# Patient Record
Sex: FEMALE | Race: OTHER | HISPANIC OR LATINO | URBAN - METROPOLITAN AREA
[De-identification: names, ages, dates, MRNs, and addresses within clinical notes are randomized per-mention and may not be internally consistent; named-entity substitution may affect disease eponyms.]

---

## 2020-01-03 ENCOUNTER — EMERGENCY (EMERGENCY)
Facility: HOSPITAL | Age: 55
LOS: 1 days | End: 2020-01-03
Admitting: EMERGENCY MEDICINE
Payer: COMMERCIAL

## 2020-01-03 PROCEDURE — 99284 EMERGENCY DEPT VISIT MOD MDM: CPT

## 2020-01-03 PROCEDURE — 74175 CTA ABDOMEN W/CONTRAST: CPT | Mod: 26

## 2021-05-11 ENCOUNTER — APPOINTMENT (OUTPATIENT)
Age: 56
End: 2021-05-11

## 2021-06-08 ENCOUNTER — APPOINTMENT (OUTPATIENT)
Age: 56
End: 2021-06-08

## 2024-05-21 DIAGNOSIS — Z87.19 PERSONAL HISTORY OF OTHER DISEASES OF THE DIGESTIVE SYSTEM: ICD-10-CM

## 2024-05-21 DIAGNOSIS — Z78.9 OTHER SPECIFIED HEALTH STATUS: ICD-10-CM

## 2024-05-21 DIAGNOSIS — Z91.040 LATEX ALLERGY STATUS: ICD-10-CM

## 2024-05-21 DIAGNOSIS — J45.20 MILD INTERMITTENT ASTHMA, UNCOMPLICATED: ICD-10-CM

## 2024-05-21 DIAGNOSIS — Z86.79 PERSONAL HISTORY OF OTHER DISEASES OF THE CIRCULATORY SYSTEM: ICD-10-CM

## 2024-05-21 DIAGNOSIS — Z83.6 FAMILY HISTORY OF OTHER DISEASES OF THE RESPIRATORY SYSTEM: ICD-10-CM

## 2024-05-21 PROBLEM — Z00.00 ENCOUNTER FOR PREVENTIVE HEALTH EXAMINATION: Status: ACTIVE | Noted: 2024-05-21

## 2024-05-22 ENCOUNTER — APPOINTMENT (OUTPATIENT)
Dept: PEDIATRIC ALLERGY IMMUNOLOGY | Facility: CLINIC | Age: 59
End: 2024-05-22
Payer: COMMERCIAL

## 2024-05-22 ENCOUNTER — NON-APPOINTMENT (OUTPATIENT)
Age: 59
End: 2024-05-22

## 2024-05-22 VITALS
OXYGEN SATURATION: 97 % | BODY MASS INDEX: 31.56 KG/M2 | DIASTOLIC BLOOD PRESSURE: 84 MMHG | SYSTOLIC BLOOD PRESSURE: 124 MMHG | HEIGHT: 63 IN | HEART RATE: 60 BPM | WEIGHT: 178.13 LBS | TEMPERATURE: 97.8 F

## 2024-05-22 DIAGNOSIS — T78.04XD ANAPHYLACTIC REACTION DUE TO FRUITS AND VEGETABLES, SUBSEQUENT ENCOUNTER: ICD-10-CM

## 2024-05-22 DIAGNOSIS — J30.1 ALLERGIC RHINITIS DUE TO POLLEN: ICD-10-CM

## 2024-05-22 DIAGNOSIS — J45.31 MILD PERSISTENT ASTHMA WITH (ACUTE) EXACERBATION: ICD-10-CM

## 2024-05-22 DIAGNOSIS — T78.05XD ANAPHYLACTIC REACTION DUE TO TREE NUTS AND SEEDS, SUBSEQUENT ENCOUNTER: ICD-10-CM

## 2024-05-22 PROCEDURE — 94010 BREATHING CAPACITY TEST: CPT

## 2024-05-22 PROCEDURE — 99214 OFFICE O/P EST MOD 30 MIN: CPT | Mod: 25

## 2024-05-22 RX ORDER — LEVALBUTEROL TARTRATE 45 UG/1
45 AEROSOL, METERED ORAL
Qty: 1 | Refills: 5 | Status: ACTIVE | COMMUNITY
Start: 2024-05-22 | End: 1900-01-01

## 2024-05-22 RX ORDER — EPINEPHRINE 0.3 MG/.3ML
0.3 INJECTION INTRAMUSCULAR
Qty: 4 | Refills: 2 | Status: ACTIVE | COMMUNITY
Start: 2024-05-22 | End: 1900-01-01

## 2024-05-22 RX ORDER — OLOPATADINE HYDROCHLORIDE 665 UG/1
0.6 SPRAY, METERED NASAL TWICE DAILY
Qty: 1 | Refills: 11 | Status: ACTIVE | COMMUNITY
Start: 2024-05-22 | End: 1900-01-01

## 2024-05-22 NOTE — HISTORY OF PRESENT ILLNESS
[de-identified] : In office for follow-up for asthma, allergic rhinitis and food allergies.  Followed in the office since 9/21/2016 and previously from 6/28/2006.  Initially she gave a history of allergic reactions to bananas, apple and watermelon, consisting of swollen eyes, runny nose, itchy mouth lips and ears, throat discomfort and chest tightness.  Subsequently she developed lip and tongue tingling and throat discomfort from nuts.  She also has a history of significant allergies in the spring and in the fall.  She had chest tightness with exposure to mold.  Blood work in 2019 showed a borderline reaction to banana and was negative for latex.  Blood work in 2016 showed IgE to apple, banana, cherry, peach, pear, plum, peanuts, almond, hazelnut, pistachio, chestnut.  Skin testing in 2006 was strongly positive to tree pollen, grass, ragweed, Alternaria mold, also positive to Cladosporium.  She had systemic reaction to skin testing, consisting of throat tightness and cough.  She was treated with epinephrine, Solu-Cortef, Benadryl and cimetidine.  Spirometry's were excellent including in 2019. Today she reports having increased allergy symptoms in the last few weeks, consisting of sore throat, runny nose and cough at night.  She gets wheezing at times especially with laughing.  She used to have levalbuterol at hand, but not currently.  Albuterol made her very shaky and jittery.  She is not taking any oral antihistamines because she gets tired from them.  She used Flonase in the past but not recently.  She does not feel stuffy.  She gets tired easily and gets shortness of breath with exertion.  She does not get sick often and does not require frequent antibiotics. Foods: She avoids peach, nectarine, banana, apples, pear, points, cherry, plant, prune, apricots, and exposure to raw potatoes.  She may eat in small amounts: Citrus, berries, grapes and watermelon.  She gets gagging if she eats more.  She also avoids all tree nuts.  Carries EpiPen, needs refill. Medications reactions: Escitalopram caused irritable bowel syndrome and increased sleepiness.  Sertraline causes vision changes, headaches, drowsiness and dry mouth.  Prednisone causes nausea.  Cephalexin caused anaphylaxis (widespread urticaria, swollen lips and tongue, difficulty breathing).  Ciprofloxacin and oxycodone caused increased heart rate, increased body temperature, nausea, vomiting, dizziness, fatigue. Also has a history of latex allergy.

## 2024-05-22 NOTE — REASON FOR VISIT
[Routine Follow-Up] : a routine follow-up visit for [Allergic Rhinitis] : allergic rhinitis [To Food] : allergy to food [Asthma] : asthma

## 2024-05-22 NOTE — SOCIAL HISTORY
[de-identified] : House with no carpet in the bedroom, 3 cats, no cigarette smoke exposure, room air conditioning, baseboard heating.

## 2024-05-22 NOTE — ASSESSMENT
[FreeTextEntry1] : Asthma mild persistent with increased symptoms: Has increased cough at night and shortness of breath, occasional wheezing.  Spirometry is normal but significantly decreased from previous. Levalbuterol HFA as needed.  Also use levalbuterol HFA, 2 puffs in the morning prior to Arnuity (15 minutes before) for the next 2 weeks.  Arnuity 200, 1 puff in the morning.  Return in 3 months for repeat spirometry. Allergic rhinitis (pollen): Olopatadine 0.6%, nasal spray, 1 spray to each nostril twice daily.  If stuffy, add Flonase Sensimist, 1 spray twice daily to each nostril. 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  Carry EpiPen. Follow up in 3 months.

## 2024-05-22 NOTE — PHYSICAL EXAM
[Alert] : alert [No Acute Distress] : no acute distress [Normal Voice/Communication] : normal voice communication [Supple] : the neck was supple [Normal S1, S2] : normal S1 and S2 [Regular Rhythm] : with a regular rhythm [Soft] : abdomen soft [Normal Cervical Lymph Nodes] : cervical [Skin Intact] : skin intact  [No Rash] : no rash [No clubbing] : no clubbing [No Cyanosis] : no cyanosis [Alert, Awake, Oriented as Age-Appropriate] : alert, awake, oriented as age appropriate [de-identified] : Eyes clear. [de-identified] : Throat mild patchy erythema.  This mucosa pink, no stuffiness, scant clear discharge.  Tympanic membranes normal.  No sinus tenderness. [de-identified] : Chest clear, good air entry, no wheezing or crackles.

## 2024-05-22 NOTE — IMPRESSION
[Spirometry] : Spirometry [Normal Spirometry] : spirometry normal [FreeTextEntry1] : Spirometry normal, but significant decrease from 2019.

## 2024-05-23 ENCOUNTER — RX CHANGE (OUTPATIENT)
Age: 59
End: 2024-05-23

## 2024-05-23 ENCOUNTER — NON-APPOINTMENT (OUTPATIENT)
Age: 59
End: 2024-05-23

## 2024-05-23 RX ORDER — BUDESONIDE 180 UG/1
180 AEROSOL, POWDER RESPIRATORY (INHALATION)
Qty: 1 | Refills: 5 | Status: ACTIVE | COMMUNITY
Start: 1900-01-01 | End: 1900-01-01

## 2024-05-23 RX ORDER — FLUTICASONE FUROATE 200 UG/1
200 POWDER RESPIRATORY (INHALATION) DAILY
Qty: 1 | Refills: 5 | Status: DISCONTINUED | COMMUNITY
Start: 2024-05-22 | End: 2024-05-23

## 2024-08-21 ENCOUNTER — NON-APPOINTMENT (OUTPATIENT)
Age: 59
End: 2024-08-21

## 2024-08-21 ENCOUNTER — APPOINTMENT (OUTPATIENT)
Dept: PEDIATRIC ALLERGY IMMUNOLOGY | Facility: CLINIC | Age: 59
End: 2024-08-21
Payer: COMMERCIAL

## 2024-08-21 VITALS
DIASTOLIC BLOOD PRESSURE: 79 MMHG | OXYGEN SATURATION: 99 % | TEMPERATURE: 98 F | HEART RATE: 55 BPM | SYSTOLIC BLOOD PRESSURE: 115 MMHG

## 2024-08-21 DIAGNOSIS — J30.1 ALLERGIC RHINITIS DUE TO POLLEN: ICD-10-CM

## 2024-08-21 DIAGNOSIS — T78.04XD ANAPHYLACTIC REACTION DUE TO FRUITS AND VEGETABLES, SUBSEQUENT ENCOUNTER: ICD-10-CM

## 2024-08-21 DIAGNOSIS — J45.20 MILD INTERMITTENT ASTHMA, UNCOMPLICATED: ICD-10-CM

## 2024-08-21 PROCEDURE — 99214 OFFICE O/P EST MOD 30 MIN: CPT | Mod: 25

## 2024-08-21 PROCEDURE — 94010 BREATHING CAPACITY TEST: CPT

## 2024-08-21 NOTE — IMPRESSION
[Spirometry] : Spirometry [Normal Spirometry] : spirometry normal [FreeTextEntry1] : Spirometry normal, increase in small airways flow from 5/22/2024.

## 2024-08-21 NOTE — PHYSICAL EXAM
[Alert] : alert [No Acute Distress] : no acute distress [Normal Voice/Communication] : normal voice communication [Supple] : the neck was supple [Normal S1, S2] : normal S1 and S2 [No murmur] : no murmur [Regular Rhythm] : with a regular rhythm [Normal Cervical Lymph Nodes] : cervical [Skin Intact] : skin intact  [No Rash] : no rash [No clubbing] : no clubbing [No Cyanosis] : no cyanosis [Alert, Awake, Oriented as Age-Appropriate] : alert, awake, oriented as age appropriate [de-identified] : Eyes clear. [de-identified] : Throat clear.  Nasal mucosa pink, no stuffiness or discharge.  Tympanic membranes normal.  No sinus tenderness. [de-identified] : Chest clear, good air entry, no wheezing or crackles.

## 2024-08-21 NOTE — HISTORY OF PRESENT ILLNESS
[de-identified] : In office for follow-up.  Seen on May 22, 2024 for significant allergies and cough at night.  She also had wheezing at times especially with laughing.  She has a history of being jittery with albuterol.  She also gets tired from oral antihistamines.  At the time she was using Flonase.  She was advised to start Arnuity 200, 1 puff daily, and levalbuterol HFA as needed.  Arnuity was not covered and it was switched to Pulmicort 180.  It turned out that the Pulmicort was not covered either.  Her symptoms slowly improved if the pollen season was over.  Now she uses levalbuterol less than once a week.  She has no cough or shortness of breath.  She feels well.  Usually gets increased symptoms in spring and fall.  She was also prescribed olopatadine nasal spray, which helped. Skin testing in 2006 was strongly positive to tree pollen, grass, ragweed, Alternaria and Cladosporium.  She had systemic reaction to skin testing that required epinephrine. She avoids tree nuts and several foods (peach, nectarine, banana, apple, pear, cherry, plum, prune, apricot, raw potatoes).  She carries EpiPen. She had multiple reaction to medications including anaphylaxis with cephalexin and history of latex allergy. Current medications: Famotidine, Qulipta daily for migraines, Nurtec and Ubrelvy as needed for migraines, propranolol 60 mg for migraines, flaxseed oil for dry eyes, probiotic, calcium and vitamin D, venlafaxine, lactase.

## 2024-08-21 NOTE — ASSESSMENT
[FreeTextEntry1] : Asthma mild intermittent now, mild persistent in spring and fall.  Spirometry improved from 5/22/2024 (increased small airway flow).  Currently needs levalbuterol less than once a week but gets increased symptoms in spring and fall.  Patient to call insurance company to find out which maintenance inhaler is covered by plan, and notify our office.  Will send prescription.  Needs to start maintenance for asthma soon in preparation for fall. Allergic rhinitis (pollen, mold): Use olopatadine 0.6% and Flonase Sensimist 1 spray per nostril twice daily from both daily in spring and fall. Food allergies (fruits, nuts): Continue avoidance, carry EpiPen. Follow-up in March.

## 2025-03-11 ENCOUNTER — APPOINTMENT (OUTPATIENT)
Dept: PEDIATRIC ALLERGY IMMUNOLOGY | Facility: CLINIC | Age: 60
End: 2025-03-11
Payer: COMMERCIAL

## 2025-03-11 ENCOUNTER — NON-APPOINTMENT (OUTPATIENT)
Age: 60
End: 2025-03-11

## 2025-03-11 ENCOUNTER — RX CHANGE (OUTPATIENT)
Age: 60
End: 2025-03-11

## 2025-03-11 VITALS
TEMPERATURE: 98.5 F | OXYGEN SATURATION: 98 % | SYSTOLIC BLOOD PRESSURE: 105 MMHG | HEART RATE: 64 BPM | DIASTOLIC BLOOD PRESSURE: 70 MMHG

## 2025-03-11 DIAGNOSIS — J45.31 MILD PERSISTENT ASTHMA WITH (ACUTE) EXACERBATION: ICD-10-CM

## 2025-03-11 DIAGNOSIS — T78.04XD ANAPHYLACTIC REACTION DUE TO FRUITS AND VEGETABLES, SUBSEQUENT ENCOUNTER: ICD-10-CM

## 2025-03-11 DIAGNOSIS — J30.1 ALLERGIC RHINITIS DUE TO POLLEN: ICD-10-CM

## 2025-03-11 PROCEDURE — 99214 OFFICE O/P EST MOD 30 MIN: CPT | Mod: 25

## 2025-03-11 PROCEDURE — 94010 BREATHING CAPACITY TEST: CPT

## 2025-03-11 RX ORDER — BECLOMETHASONE DIPROPIONATE HFA 80 UG/1
80 AEROSOL, METERED RESPIRATORY (INHALATION) DAILY
Qty: 1 | Refills: 5 | Status: DISCONTINUED | COMMUNITY
Start: 2025-03-11 | End: 2025-03-11

## 2025-03-11 RX ORDER — BUDESONIDE 180 UG/1
180 AEROSOL, POWDER RESPIRATORY (INHALATION)
Qty: 1 | Refills: 5 | Status: ACTIVE | COMMUNITY
Start: 1900-01-01 | End: 1900-01-01

## 2025-06-16 ENCOUNTER — RX CHANGE (OUTPATIENT)
Age: 60
End: 2025-06-16

## 2025-06-16 RX ORDER — BUDESONIDE 180 UG/1
180 AEROSOL, POWDER RESPIRATORY (INHALATION)
Qty: 3 | Refills: 3 | Status: ACTIVE | COMMUNITY
Start: 1900-01-01 | End: 1900-01-01